# Patient Record
Sex: MALE | Race: WHITE | Employment: FULL TIME | URBAN - METROPOLITAN AREA
[De-identification: names, ages, dates, MRNs, and addresses within clinical notes are randomized per-mention and may not be internally consistent; named-entity substitution may affect disease eponyms.]

---

## 2017-09-27 ENCOUNTER — APPOINTMENT (OUTPATIENT)
Dept: GENERAL RADIOLOGY | Age: 22
End: 2017-09-27
Attending: NURSE PRACTITIONER
Payer: COMMERCIAL

## 2017-09-27 ENCOUNTER — HOSPITAL ENCOUNTER (OUTPATIENT)
Age: 22
Discharge: HOME | End: 2017-09-27
Payer: COMMERCIAL

## 2017-09-27 VITALS
DIASTOLIC BLOOD PRESSURE: 75 MMHG | OXYGEN SATURATION: 99 % | BODY MASS INDEX: 22.53 KG/M2 | HEART RATE: 78 BPM | SYSTOLIC BLOOD PRESSURE: 109 MMHG | WEIGHT: 170 LBS | TEMPERATURE: 98 F | RESPIRATION RATE: 20 BRPM | HEIGHT: 73 IN

## 2017-09-27 DIAGNOSIS — S60.222A CONTUSION OF LEFT HAND, INITIAL ENCOUNTER: Primary | ICD-10-CM

## 2017-09-27 PROCEDURE — 99203 OFFICE O/P NEW LOW 30 MIN: CPT

## 2017-09-27 PROCEDURE — 73130 X-RAY EXAM OF HAND: CPT | Performed by: NURSE PRACTITIONER

## 2017-09-27 RX ORDER — METHYLPHENIDATE HYDROCHLORIDE 54 MG/1
54 TABLET ORAL
Refills: 0 | COMMUNITY
Start: 2017-09-26

## 2017-09-27 NOTE — ED PROVIDER NOTES
Patient presents with:  Hand Injury      HPI:     Rosita Gilford is a 20year old male who presents today with a chief complaint of pain in the left hand between the thumb and pointer digit after an injury that occurred yesterday.   The patient states he w hand between the pointer and thumb with movement. No deformity. No wrist pain. No snuffbox tenderness. Moving all digits of the hand without difficulty. Point Tenderness: Yes    /75   Pulse 78   Temp (!) 97.5 °F (36.4 °C) (Oral)   Resp 20   Ht 185. 4 days

## 2017-09-27 NOTE — ED INITIAL ASSESSMENT (HPI)
Dropped bumper from his  truck onto left hand last night. Pain and swelling to left dorsal hand. Painful ROM. + distal CMS.

## 2018-03-31 ENCOUNTER — HOSPITAL (OUTPATIENT)
Dept: OTHER | Age: 23
End: 2018-03-31
Attending: EMERGENCY MEDICINE